# Patient Record
Sex: OTHER/UNKNOWN | Race: WHITE | Employment: UNEMPLOYED | URBAN - METROPOLITAN AREA
[De-identification: names, ages, dates, MRNs, and addresses within clinical notes are randomized per-mention and may not be internally consistent; named-entity substitution may affect disease eponyms.]

---

## 2023-01-01 ENCOUNTER — HOSPITAL ENCOUNTER (EMERGENCY)
Age: 68
End: 2023-04-01
Attending: EMERGENCY MEDICINE

## 2023-01-01 VITALS — OXYGEN SATURATION: 81 % | HEART RATE: 87 BPM | RESPIRATION RATE: 9 BRPM

## 2023-01-01 DIAGNOSIS — I46.9 CARDIAC ARREST (HCC): Primary | ICD-10-CM

## 2023-01-01 PROCEDURE — 94761 N-INVAS EAR/PLS OXIMETRY MLT: CPT

## 2023-01-01 PROCEDURE — 99285 EMERGENCY DEPT VISIT HI MDM: CPT

## 2023-01-01 PROCEDURE — 2500000003 HC RX 250 WO HCPCS: Performed by: EMERGENCY MEDICINE

## 2023-01-01 PROCEDURE — 31500 INSERT EMERGENCY AIRWAY: CPT

## 2023-01-01 PROCEDURE — 6360000002 HC RX W HCPCS

## 2023-01-01 PROCEDURE — 2700000000 HC OXYGEN THERAPY PER DAY

## 2023-01-01 PROCEDURE — 6360000002 HC RX W HCPCS: Performed by: EMERGENCY MEDICINE

## 2023-01-01 PROCEDURE — 92950 HEART/LUNG RESUSCITATION CPR: CPT

## 2023-01-01 RX ORDER — EPINEPHRINE 0.1 MG/ML
SYRINGE (ML) INJECTION DAILY PRN
Status: COMPLETED | OUTPATIENT
Start: 2023-01-01 | End: 2023-01-01

## 2023-01-01 RX ORDER — EPINEPHRINE 0.1 MG/ML
SYRINGE (ML) INJECTION
Status: DISCONTINUED
Start: 2023-01-01 | End: 2023-01-01 | Stop reason: HOSPADM

## 2023-01-01 RX ADMIN — EPINEPHRINE 1 MG: 0.1 INJECTION, SOLUTION ENDOTRACHEAL; INTRACARDIAC; INTRAVENOUS at 11:55

## 2023-01-01 RX ADMIN — EPINEPHRINE 1 MG: 0.1 INJECTION, SOLUTION ENDOTRACHEAL; INTRACARDIAC; INTRAVENOUS at 11:46

## 2023-01-01 RX ADMIN — EPINEPHRINE 1 MG: 0.1 INJECTION, SOLUTION ENDOTRACHEAL; INTRACARDIAC; INTRAVENOUS at 11:38

## 2023-01-01 RX ADMIN — ALTEPLASE 50 MG: KIT at 12:01

## 2023-01-01 RX ADMIN — EPINEPHRINE 1 MG: 0.1 INJECTION, SOLUTION ENDOTRACHEAL; INTRACARDIAC; INTRAVENOUS at 11:34

## 2023-01-01 RX ADMIN — SODIUM BICARBONATE 50 MEQ: 84 INJECTION, SOLUTION INTRAVENOUS at 11:43

## 2023-01-01 RX ADMIN — EPINEPHRINE 2 MCG: 0.1 INJECTION, SOLUTION ENDOTRACHEAL; INTRACARDIAC; INTRAVENOUS at 11:49

## 2023-04-01 NOTE — ED PROVIDER NOTES
ED Attending Attestation Note     Date of evaluation: 4/1/2023    This patient was seen by the resident. I have seen and examined the patient, agree with the workup, evaluation, management and diagnosis. The care plan has been discussed. I have reviewed the ECG and concur with the resident's interpretation. My assessment reveals a male presenting in cardiac arrest.  Report by EMS he was found down last seen normal perhaps an hour and a half before that unknown downtime prior to EMS arrival.  On EMS arrival there he was pulseless they performed PEA resuscitation with multiple rounds of epinephrine and after about 28 to 30 minutes did get V-fib. He was in ventricular fibrillation for 3 shocks with her unable to obtain pulse. He went into PEA and they began transport. He showed us to update us about 7 to 8 minutes later still in PEA. He had had about 45 minutes of at least downtime with EMS CPR without pulse. After couple rounds here with epinephrine he did get a thready pulse for about 30 seconds but then decompensated quickly. We did multiple rounds of CPR and epinephrine and he would get a thready pulse and then quickly decompensate after 30 seconds into PEA. He was intubated single pass fact with a 7-1/2 tube during CPR. During 1 pulse check we were able to obtain an EKG that showed ST elevation, he quickly decompensated and was pulseless again despite multiple doses of push dose epinephrine in epic. We continued CPR throughout decided to push lytics he was given 50 mg of tPA. This circulated for about 15 minutes on the last pulse checks he was in asystole x2. Throughout the entire course his pupils were fixed and dilated he had no gag reflex was intubated without any gag early on in his course. Cold his extremities.   Did discuss with cardiology during the final 15 minutes of CPR and have been down at least an hour with very thready intermittent pulses and given his prognosis would not have been a cath lab candidate. Despite tPA he decompensated into asystole quickly after talking with cardiology and time of death was called at [phone]. We were unable to get a hold of family during the resuscitation. Critical Care:  Due to the immediate potential for life-threatening deterioration due to cardiac arrest, I spent 45 minutes providing critical care. This time is excluding time spent performing procedures. Intubation    Date/Time: 4/1/2023 12:32 PM  Performed by: Daisy Ashton MD  Authorized by: Daisy Ashton MD     Consent:     Consent obtained:  Emergent situation  Pre-procedure details:     Indication: failure to oxygenate, failure to protect airway, failure to ventilate and predicted clinical deterioration      Pharmacologic strategy: none      Induction agents:  None    Paralytics:  None  Procedure details:     CPR in progress: yes      Laryngoscope blade:   Mac 4    Tube size (mm):  7.5    Number of attempts:  1  Placement assessment:     Placement verification: colorimetric ETCO2 and equal breath sounds    Comments:      Middle of cardiac arrest.      Daisy Ashton MD  04/01/23 [id number]

## 2023-04-01 NOTE — ED PROVIDER NOTES
[id number] [facility] RESIDENT NOTE       Date of evaluation: 4/1/2023    Chief Complaint     No chief complaint on file. of Present Illness     Zach Henry is a 69 y.o. adult who presents to the emergency department in cardiac arrest.  Patient was found down after being seen last known well 1 and half hours prior to EMS arrival.  Patient noted to be pulseless and unresponsive therefore ACLS was initiated. She was initially noted to be in PEA and underwent appropriate CPR for approx 30mins until his rhythm changed wit Vfib. Patient defibrillated x3. Received a total of epi x5, bicarb x1 without ROSC. FSBS of 87. Patient transported to the ED for further management. Of note, patient has been recently vomiting and feeling unwell for the last week but according to family is relatively healthy. Review of Systems     Review of Systems   Unable to perform ROS: Acuity of condition     Past Medical, Surgical, Family, and Social History     Zach Henry has no past medical history on file. Zach Henry has no past surgical history on file. Jason Anderson family history is not on file. Feli Monroe     Medications     Previous Medications    No medications on file       Allergies     Zach Henry has no allergies on file. Physical Exam     INITIAL VITALS:  ,  , Heart Rate: 93, Resp: (!) 9, SpO2: 93 %   Physical Exam  Constitutional:       Comments: unresponsive   HENT:      Head: Normocephalic and atraumatic. Mouth/Throat:      Comments: iGel in place   Eyes:      Comments: Fixed and dilated pupils bilaterally    Cardiovascular:      Comments: No palpable pulse  Pulmonary:      Comments: Clear breath sounds with bagging   Abdominal:      General: Abdomen is flat. There is no distension.        DiagnosticResults     EKG   Interpreted in conjunction with emergencydepartment physician Lazarus Padilla MD  Rhythm: normal sinus   Rate: / Aroldo Coy is a 69 y.o. adult department in cardiac arrest.  Initial evaluation, patient was noted to have no palpable pulse at this time, patient was transferred over to our hospitalist ablator pads as well as Rexine Bering device. CPR was resumed and patient noted to be bagging well with i-gel in place. Igel exchanged for an ETT without complications. Please refer to procedure note for further details. Patient given additional Epi x2 in accordance with ACLS. On pulse check, patient noted to have a thready pulse in the femoral area. EKG attempted and BP checked; however prior to capturing the EKG, patient noted to lose pulses again. CPR resumed. With each pulse check, the patient noted to have yet again a thready pulse but we were able to capture an EKG that was consistent with an inferior STEMI. A line attempted in the patient at this time, but given the degree of arterial calcification, the line was unsuccessful. At this time, tPA was given to the patient in attempt to lysed the occlusion. CPR continued for additional 10 mins with pulse checks every 2 mins and noted decompensation to asystole. TOD called at 1212pm. Family notified. Is this patient to be included in the SEP-1 core measure due to severe sepsis or septic shock? No Exclusion criteria - the patient is NOT to be included for SEP-1 Core Measure due to: Infection is not suspected    This patient was also evaluated by the attending physician. All care plans werediscussed and agreed upon. Clinical Impression     1. Cardiac arrest [facility])        Disposition     PATIENT REFERRED TO:  No follow-up provider specified.     DISCHARGE MEDICATIONS:  New Prescriptions    No medications on file       DISPOSITION  2023 12:54:23 PM        Kayla Ballesteros MD  Resident  [phone]